# Patient Record
(demographics unavailable — no encounter records)

---

## 2024-11-18 NOTE — ASSESSMENT
[FreeTextEntry1] : NAVI is a 6 year old with ASD and ADHD here with mother to establish care. Currently in a self contained classroom with an IEP and receives OT. Non focal neuro exam. Denies staring, twitching, seizure or seizure-like activity. Will d/c clonidine. Will continue risperidone. Will start stimulant medication. [place] forms provided for parent and teacher.

## 2024-11-18 NOTE — CONSULT LETTER
[Dear  ___] : Dear  [unfilled], [Consult Letter:] : I had the pleasure of evaluating your patient, [unfilled]. [Please see my note below.] : Please see my note below. [Consult Closing:] : Thank you very much for allowing me to participate in the care of this patient.  If you have any questions, please do not hesitate to contact me. [Sincerely,] : Sincerely, [FreeTextEntry3] : Domitila Langley, YAQUELIN-BC Board Certified Family Nurse Practitioner Pediatric Neurology [facility] [address] [place] [address] Tel: [phone] Fax: [phone]  Patient/Caregiver provided printed discharge information.

## 2024-11-18 NOTE — PLAN
[FreeTextEntry1] :  - Adderall XR 5 mg daily - Risperidone 0.5 mg daily - [place] questionnaires given for parent and teacher- to be returned - Discussed use of medication as well as possible side effects - Follow up 1 month

## 2024-11-18 NOTE — PHYSICAL EXAM
[Well-appearing] : well-appearing [Normocephalic] : normocephalic [No dysmorphic facial features] : no dysmorphic facial features [Straight] : straight [No deformities] : no deformities [Alert] : alert [Well related, good eye contact] : well related, good eye contact [Conversant] : conversant [Normal speech and language] : normal speech and language [Follows instructions well] : follows instructions well [No facial asymmetry or weakness] : no facial asymmetry or weakness [Normal axial and appendicular muscle tone] : normal axial and appendicular muscle tone [Gets up on table without difficulty] : gets up on table without difficulty [No abnormal involuntary movements] : no abnormal involuntary movements [Walks and runs well] : walks and runs well [Good walking balance] : good walking balance [Normal gait] : normal gait

## 2024-11-18 NOTE — HISTORY OF PRESENT ILLNESS
[FreeTextEntry1] : ADRIAN BURCIAGA is a 6 year old male with ASD here with mother for an initial evaluation for ADHD.    Educational assessment: Current Grade: 1st Current District: [employer]  Adrian is currently in a self contained classroom with an IEP and receives OT. He also has a 1:1 para. He was recently diagnosed with ASD, but mother does not feel that is an accurate diagnosis. They started him on Risperidone 0.5 mg daily and clonidine 0.05 mg daily. He started these almost a year ago. There is slight improvement with medication, but there are continued struggles. He previously trialed Ritalin which made him aggressive, and guanfacine which had no effect.  Adrian has a very difficult time staying focused and completing his work. He requires a lot of redirection and prompting. He is easily distracted by what is going on around him. He has a hard time sitting still and he is always fidgeting. He is impulsive and oftentimes speaks when he is not supposed to. He struggles with regulating his emotions and he has a low frustration tolerance. He does not follow instructions well. Academically he is doing well.  Adrian's older brother has ADHD and ASD and he started mimicking his brother's behavior. At home when completing homework mother must sit with him and provide constant refocusing for him to stay on task. She constantly has to tell him to sit back down in his seat. He is able to sit still for a movie. He is unable to sit for a meal. He requires a lot of prompting to follow even one step commands. He needs repetition of directions and refocusing. He participates in sports out of school and he does well overall. He is very competitive and sometimes he will push other kids. He is very sensitive and gets upset easily.   Socially he can play well with other kids, but he does get aggressive. He has a hard time understanding if people do things on accident.    No concern for anxiety, depression, OCD.   Denies any issues with sleep initiation or maintaining sleep throughout the night. Denies any parasomnias or restlessness while asleep.   Denies staring, twitching, seizure or seizure-like activity. No serious head injury, meningoencephalitis. 
No

## 2024-12-17 NOTE — PLAN
[FreeTextEntry1] :  - Adderall XR 10 mg daily - Risperidone 0.5 mg BID - Discussed use of medications as well as possible side effects - Follow up 3 months

## 2024-12-17 NOTE — ASSESSMENT
[FreeTextEntry1] : NAVI is a 6 year old with ASD and ADHD on risperidone and adderall here with mother for a follow up. Currently in a self contained classroom with an IEP and receives OT.  Reportedly doing well on current medication regimen. Denies any negative side effects. Will increase current dose of medication for ADHD. Will make risperidone BID.

## 2024-12-17 NOTE — CONSULT LETTER
[Dear  ___] : Dear  [unfilled], [Consult Letter:] : I had the pleasure of evaluating your patient, [unfilled]. [Please see my note below.] : Please see my note below. [Consult Closing:] : Thank you very much for allowing me to participate in the care of this patient.  If you have any questions, please do not hesitate to contact me. [Sincerely,] : Sincerely, [FreeTextEntry3] : Domitila Langley, YAQUELIN-BC Board Certified Family Nurse Practitioner Pediatric Neurology [facility] [address] [place] [address] Tel: [phone] Fax: [phone]

## 2024-12-17 NOTE — REASON FOR VISIT
[Home] : at home, [unfilled] , at the time of the visit. [Medical Office: ([facility])___] : at the medical office located in  [Follow-Up Evaluation] : a follow-up evaluation for [ADHD] : ADHD [Mother] : mother [Medical Records] : medical records [FreeTextEntry2] : mother

## 2024-12-17 NOTE — HISTORY OF PRESENT ILLNESS
[FreeTextEntry1] : ADRIAN BURCIAGA is a 6 year old male with ASD and ADHD on Risperidone 0.5 mg daily and Adderall XR 5 mg daily here for a follow up.     Adrian has been doing well. There has been some improvement with adderall in his attentiveness and defiance. There is less resistance when being told to do things. Denies any negative side effects.   Initial Evaluation:  Educational assessment: Current Grade: 1st Current District: [employer]  Adrian is currently in a self contained classroom with an IEP and receives OT. He also has a 1:1 para. He was recently diagnosed with ASD, but mother does not feel that is an accurate diagnosis. They started him on Risperidone 0.5 mg daily and clonidine 0.05 mg daily. He started these almost a year ago. There is slight improvement with medication, but there are continued struggles. He previously trialed Ritalin which made him aggressive, and guanfacine which had no effect.  Adrian has a very difficult time staying focused and completing his work. He requires a lot of redirection and prompting. He is easily distracted by what is going on around him. He has a hard time sitting still and he is always fidgeting. He is impulsive and oftentimes speaks when he is not supposed to. He struggles with regulating his emotions and he has a low frustration tolerance. He does not follow instructions well. Academically he is doing well.  Adrian's older brother has ADHD and ASD and he started mimicking his brother's behavior. At home when completing homework mother must sit with him and provide constant refocusing for him to stay on task. She constantly has to tell him to sit back down in his seat. He is able to sit still for a movie. He is unable to sit for a meal. He requires a lot of prompting to follow even one step commands. He needs repetition of directions and refocusing. He participates in sports out of school and he does well overall. He is very competitive and sometimes he will push other kids. He is very sensitive and gets upset easily.   Socially he can play well with other kids, but he does get aggressive. He has a hard time understanding if people do things on accident.    No concern for anxiety, depression, OCD.   Denies any issues with sleep initiation or maintaining sleep throughout the night. Denies any parasomnias or restlessness while asleep.   Denies staring, twitching, seizure or seizure-like activity. No serious head injury, meningoencephalitis.

## 2025-05-05 NOTE — HISTORY OF PRESENT ILLNESS
[FreeTextEntry1] : ADRIAN BURCIAGA is a 6 year old male with ASD and ADHD on Risperidone 0.5 mg BID and Adderall XR 10 mg daily here for a follow up.     Interval Hx: Mother says that Adrian has been more aggressive than usual. When he does not get his way he will hit, and he is displaying more defiant behavior. The school is not seeing it, but it is occurring at home. Mother says on the weekend he does it during the day, but during the week she sees it in the evening. Adderall has been improving focus in school.   Initial Evaluation:  Educational assessment: Current Grade: 1st Current District: [employer]  Adrian is currently in a self contained classroom with an IEP and receives OT. He also has a 1:1 para. He was recently diagnosed with ASD, but mother does not feel that is an accurate diagnosis. They started him on Risperidone 0.5 mg daily and clonidine 0.05 mg daily. He started these almost a year ago. There is slight improvement with medication, but there are continued struggles. He previously trialed Ritalin which made him aggressive, and guanfacine which had no effect.  Adrian has a very difficult time staying focused and completing his work. He requires a lot of redirection and prompting. He is easily distracted by what is going on around him. He has a hard time sitting still and he is always fidgeting. He is impulsive and oftentimes speaks when he is not supposed to. He struggles with regulating his emotions and he has a low frustration tolerance. He does not follow instructions well. Academically he is doing well.  Adrian's older brother has ADHD and ASD and he started mimicking his brother's behavior. At home when completing homework mother must sit with him and provide constant refocusing for him to stay on task. She constantly has to tell him to sit back down in his seat. He is able to sit still for a movie. He is unable to sit for a meal. He requires a lot of prompting to follow even one step commands. He needs repetition of directions and refocusing. He participates in sports out of school and he does well overall. He is very competitive and sometimes he will push other kids. He is very sensitive and gets upset easily.   Socially he can play well with other kids, but he does get aggressive. He has a hard time understanding if people do things on accident.    No concern for anxiety, depression, OCD.   Denies any issues with sleep initiation or maintaining sleep throughout the night. Denies any parasomnias or restlessness while asleep.   Denies staring, twitching, seizure or seizure-like activity. No serious head injury, meningoencephalitis.

## 2025-05-05 NOTE — REASON FOR VISIT
[Follow-Up Evaluation] : a follow-up evaluation for [ADHD] : ADHD [Medical Records] : medical records [Home] : at home, [unfilled] , at the time of the visit. [Medical Office: ([facility])___] : at the medical office located in  [Mother] : mother [FreeTextEntry2] : mother

## 2025-05-05 NOTE — ASSESSMENT
[FreeTextEntry1] : NAVI is a 6 year old with ASD and ADHD on risperidone and adderall here with mother for a follow up. Currently in a self contained classroom with an IEP and receives OT. Denies any negative medication side effects. Will continue current dose of stimulant medication. Will increase afternoon dose of risperidone.

## 2025-05-05 NOTE — CONSULT LETTER
[Dear  ___] : Dear  [unfilled], [Consult Letter:] : I had the pleasure of evaluating your patient, [unfilled]. [Please see my note below.] : Please see my note below. [Consult Closing:] : Thank you very much for allowing me to participate in the care of this patient.  If you have any questions, please do not hesitate to contact me. [Sincerely,] : Sincerely, [FreeTextEntry3] : Domitila Langley, YAQUELIN-BC Board Certified Family Nurse Practitioner Pediatric Neurology [facility] [address] [place] [address] Tel: [phone] Fax: [phone]

## 2025-05-05 NOTE — CONSULT LETTER
[Dear  ___] : Dear  [unfilled], [Consult Letter:] : I had the pleasure of evaluating your patient, [unfilled]. [Please see my note below.] : Please see my note below. [Consult Closing:] : Thank you very much for allowing me to participate in the care of this patient.  If you have any questions, please do not hesitate to contact me. [Sincerely,] : Sincerely, [FreeTextEntry3] : Domitila Langley, YAQUELIN-BC Board Certified Family Nurse Practitioner Pediatric Neurology [facility] [address] [place] [address] Tel: [phone] Fax: [phone]

## 2025-05-05 NOTE — HISTORY OF PRESENT ILLNESS
[FreeTextEntry1] : ADRIAN BURCIAGA is a 6 year old male with ASD and ADHD on Risperidone 0.5 mg BID and Adderall XR 10 mg daily here for a follow up.     Interval Hx: Mother says that Adrian has been more aggressive than usual. When he does not get his way he will hit, and he is displaying more defiant behavior. The school is not seeing it, but it is occurring at home. Mother says on the weekend he does it during the day, but during the week she sees it in the evening. Adderall has been improving focus in school.   Initial Evaluation:  Educational assessment: Current Grade: 1st Current District: [employer]  Adrian is currently in a self contained classroom with an IEP and receives OT. He also has a 1:1 para. He was recently diagnosed with ASD, but mother does not feel that is an accurate diagnosis. They started him on Risperidone 0.5 mg daily and clonidine 0.05 mg daily. He started these almost a year ago. There is slight improvement with medication, but there are continued struggles. He previously trialed Ritalin which made him aggressive, and guanfacine which had no effect.  Adrian has a very difficult time staying focused and completing his work. He requires a lot of redirection and prompting. He is easily distracted by what is going on around him. He has a hard time sitting still and he is always fidgeting. He is impulsive and oftentimes speaks when he is not supposed to. He struggles with regulating his emotions and he has a low frustration tolerance. He does not follow instructions well. Academically he is doing well.  Adrian's older brother has ADHD and ASD and he started mimicking his brother's behavior. At home when completing homework mother must sit with him and provide constant refocusing for him to stay on task. She constantly has to tell him to sit back down in his seat. He is able to sit still for a movie. He is unable to sit for a meal. He requires a lot of prompting to follow even one step commands. He needs repetition of directions and refocusing. He participates in sports out of school and he does well overall. He is very competitive and sometimes he will push other kids. He is very sensitive and gets upset easily.   Socially he can play well with other kids, but he does get aggressive. He has a hard time understanding if people do things on accident.    No concern for anxiety, depression, OCD.   Denies any issues with sleep initiation or maintaining sleep throughout the night. Denies any parasomnias or restlessness while asleep.   Denies staring, twitching, seizure or seizure-like activity. No serious head injury, meningoencephalitis.

## 2025-05-05 NOTE — PLAN
[FreeTextEntry1] :  - Adderall XR 10 mg daily - Risperidone 1 mg BID - Discussed use of medications as well as possible side effects - Follow up 1 month

## 2025-05-05 NOTE — REASON FOR VISIT
[Follow-Up Evaluation] : a follow-up evaluation for [ADHD] : ADHD [Medical Records] : medical records [Home] : at home, [unfilled] , at the time of the visit. [Medical Office: ([facility])___] : at the medical office located in  [Mother] : mother [FreeTextEntry2] : mother